# Patient Record
Sex: FEMALE | Race: WHITE | Employment: UNEMPLOYED | URBAN - METROPOLITAN AREA
[De-identification: names, ages, dates, MRNs, and addresses within clinical notes are randomized per-mention and may not be internally consistent; named-entity substitution may affect disease eponyms.]

---

## 2017-05-24 ENCOUNTER — TELEPHONE (OUTPATIENT)
Dept: FAMILY MEDICINE | Facility: CLINIC | Age: 62
End: 2017-05-24

## 2017-05-24 NOTE — TELEPHONE ENCOUNTER
5/24/2017    Call Regarding Preventive Health Screening Colonoscopy    Attempt 1    Message with male    Comments:       Outreach   CC

## 2017-06-02 NOTE — TELEPHONE ENCOUNTER
6/2/2017    Call Regarding Preventive Health Screening Colonoscopy    Attempt 2    Message on voicemail     Comments:       Outreach   CC

## 2017-06-08 NOTE — TELEPHONE ENCOUNTER
6/8/2017    Call Regarding Preventive Health Screening Colonoscopy    Attempt 3    Message on voicemail     Comments:       Outreach   KV